# Patient Record
Sex: MALE | Race: WHITE | HISPANIC OR LATINO | URBAN - METROPOLITAN AREA
[De-identification: names, ages, dates, MRNs, and addresses within clinical notes are randomized per-mention and may not be internally consistent; named-entity substitution may affect disease eponyms.]

---
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
## 2019-07-13 ENCOUNTER — EMERGENCY (EMERGENCY)
Facility: HOSPITAL | Age: 19
LOS: 1 days | Discharge: HOME | End: 2019-07-13
Attending: EMERGENCY MEDICINE
Payer: MEDICAID

## 2019-07-13 VITALS
RESPIRATION RATE: 18 BRPM | OXYGEN SATURATION: 97 % | HEIGHT: 64 IN | TEMPERATURE: 98 F | HEART RATE: 59 BPM | DIASTOLIC BLOOD PRESSURE: 78 MMHG | SYSTOLIC BLOOD PRESSURE: 151 MMHG | WEIGHT: 139.99 LBS

## 2019-07-13 VITALS
SYSTOLIC BLOOD PRESSURE: 145 MMHG | DIASTOLIC BLOOD PRESSURE: 89 MMHG | OXYGEN SATURATION: 99 % | HEART RATE: 60 BPM | RESPIRATION RATE: 20 BRPM | TEMPERATURE: 98 F

## 2019-07-13 PROCEDURE — 99283 EMERGENCY DEPT VISIT LOW MDM: CPT

## 2019-07-13 PROCEDURE — 82962 GLUCOSE BLOOD TEST: CPT

## 2019-07-13 RX ADMIN — Medication 50 MILLIGRAM(S): at 20:53

## 2019-07-13 NOTE — ED PROVIDER NOTE - PHYSICAL EXAMINATION
general: well appearing male, no acute distress   heent: normocephalic, atraumatic   respiratory: normal work of breathing, lungs clear to auscultation bilaterally   cardiac: regular rate and rhythm   abdomen: soft, non-tender   msk: no swelling or tenderness of lower extremities   skin: diffuse red papules across extremities and trunk, sparing palms/soles and mucous membranes  neuro: A&Ox3

## 2019-07-13 NOTE — ED PROVIDER NOTE - CLINICAL SUMMARY MEDICAL DECISION MAKING FREE TEXT BOX
18M presenting with a rash. 18M presenting with a rash. no fever, chest pain, difficulty breathing, nausea, vomiting or abdominal pain. no anaphylaxis. appears to be contact dermatitis. will discharge with steroids and allergy follow-up.

## 2019-07-13 NOTE — ED ADULT NURSE NOTE - OBJECTIVE STATEMENT
18 y.o M A&Ox3, with no pertinent pmh presents to the ED c.o rash throughout body x4 days. Pt. reports rash developed 4 days ago and started on upper extremities which has not spread to lower extremities, trunk, ABD pain. Pt. has been taking benadryl at home for itchiness. Pt. denies fevers, chills, dizziness, CP, SOB, swelling, urinary symptoms, n/v/d, congestion at this time. Pt. denies any recent use of new soaps, detergents, or lotions. Reports he went to the beach on July 4 and used sun screen, never had allergic reaction in the past. Pt. works in an airport distributing food. Has never been seen by an allergist. No pus or open sores noted. Vaccinations are up to date. Safety and comfort provided. Family at bedside.

## 2019-07-13 NOTE — ED ADULT TRIAGE NOTE - NSWEIGHTCALCTOOLDRUG_GEN_A_CORE
To confirm, Your doctor has instructed you that surgery is scheduled for 8/28/17 at  10:00 am.       Please report to [facility], [address], 1st floor, main lobby by 08:30 am Pre admit office will call this afternoon only if arrival time for surgery changes.      INSTRUCTIONS IMPORTANT!!!   Do not eat, drink, or smoke after 12 midnight. May have water or clear liquid juice until 3 hrs prior to surgery. OK to brush teeth, no gum, candy or mints!    ¨ Take only these medicines with a small swallow of water-morning of surgery.  None    Pre operative instructions:  Please review the Pre-Operative Instruction booklet that you were given.        Bathing Instructions--See page 6 in the Pre-operative booklet.      Prevention of surgical site infections:     -Keep incisions clean and dry.   -Do not soak/submerge incisions in water until completely healed.   -Do not apply lotions, powders, creams, or deodorants to site.   -Always make sure hands are cleaned with antibacterial soap/ alcohol-based                 prior to touching the surgical site.  (This includes doctors,                 nurses, staff, and yourself.)    Signs and symptoms:   -Redness and pain around the area where you had surgery   -Drainage of cloudy fluid from your surgical wound   -Fever over 100.4       I have read or had read and explained to me, and understand the above information.  Additional comments or instructions:  Received a copy of Pre-operative instructions booklet, FAQ surgical site infection sheet, and packets of hibiclens (if indicated).    
 used

## 2019-07-13 NOTE — ED PROVIDER NOTE - NSFOLLOWUPCLINICS_GEN_ALL_ED_FT
[facility]  Allergy  [address] [place] [address]  Phone: [phone]  Fax:   Follow Up Time:

## 2019-07-13 NOTE — ED PROVIDER NOTE - OBJECTIVE STATEMENT
18M no significant pmh presenting with a rash. patient reports rash started on his right forearm 4 days ago and has spread all over his body. rash is itchy, minimally responsive to Benadryl. unknown allergies to food or medications. denies fever, chest pain, difficulty breathing, nausea, vomiting or abdominal pain. no recent travel or extensive time outdoors. works at airport packaging food. similar previous episode of rash with plant exposure.

## 2019-07-13 NOTE — ED ADULT NURSE NOTE - CHPI ED NUR SYMPTOMS NEG
no confusion/no body aches/no chills/no fever/no decreased eating/drinking/no pain/no vomiting/no scaly patches on skin

## 2019-07-13 NOTE — ED PROVIDER NOTE - NSFOLLOWUPINSTRUCTIONS_ED_ALL_ED_FT
Please follow-up with your primary care doctor in the next 24-48 hours     Please follow-up with an Allergist in the next 1-2 weeks    Please use Benadryl as prescribed on the bottle fo symptom control     If you have any worsening symptoms, chest pain, difficulty breathing, nausea, vomiting or abdominal pain please return to the emergency department

## 2019-07-13 NOTE — ED PROVIDER NOTE - ATTENDING CONTRIBUTION TO CARE
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 Rash began on myrna arms. No known allergans. No shortness of breath,fever chills, abd pain. nvdc.cough. PE WDWN male awake alert with papular rash to bul upper extr, myrna ears. chest abd, myrna lower extr below knees, Sparing area under underwear, or prox thighs. RUE has area of linear rash cw contact derm, Chest clear anterior & posterior abd soft +bs, no mass guarding, neuro no focal. defects  imp Contact derm from unknown source. Tx with medrol follow up pmd/ or ed in two days for glu check  Harish Eugene MD, Facep

## 2019-07-23 PROBLEM — Z00.00 ENCOUNTER FOR PREVENTIVE HEALTH EXAMINATION: Noted: 2019-07-23

## 2019-08-22 ENCOUNTER — APPOINTMENT (OUTPATIENT)
Dept: PEDIATRIC ALLERGY IMMUNOLOGY | Facility: CLINIC | Age: 19
End: 2019-08-22
